# Patient Record
Sex: MALE | Race: WHITE | NOT HISPANIC OR LATINO | Employment: FULL TIME | URBAN - METROPOLITAN AREA
[De-identification: names, ages, dates, MRNs, and addresses within clinical notes are randomized per-mention and may not be internally consistent; named-entity substitution may affect disease eponyms.]

---

## 2024-02-06 ENCOUNTER — HOSPITAL ENCOUNTER (EMERGENCY)
Facility: HOSPITAL | Age: 44
Discharge: HOME | End: 2024-02-06
Attending: EMERGENCY MEDICINE
Payer: COMMERCIAL

## 2024-02-06 VITALS
DIASTOLIC BLOOD PRESSURE: 78 MMHG | HEIGHT: 66 IN | HEART RATE: 72 BPM | RESPIRATION RATE: 18 BRPM | WEIGHT: 200 LBS | BODY MASS INDEX: 32.14 KG/M2 | OXYGEN SATURATION: 98 % | TEMPERATURE: 98.2 F | SYSTOLIC BLOOD PRESSURE: 150 MMHG

## 2024-02-06 DIAGNOSIS — S09.90XA CLOSED HEAD INJURY, INITIAL ENCOUNTER: Primary | ICD-10-CM

## 2024-02-06 PROCEDURE — 99284 EMERGENCY DEPT VISIT MOD MDM: CPT | Performed by: EMERGENCY MEDICINE

## 2024-02-06 PROCEDURE — 99283 EMERGENCY DEPT VISIT LOW MDM: CPT | Performed by: EMERGENCY MEDICINE

## 2024-02-06 RX ORDER — ROSUVASTATIN CALCIUM 20 MG/1
20 TABLET, COATED ORAL
COMMUNITY
Start: 2023-12-27 | End: 2024-12-26

## 2024-02-06 RX ORDER — COLCHICINE 0.6 MG/1
TABLET ORAL
COMMUNITY
Start: 2023-10-24

## 2024-02-06 RX ORDER — PREDNISONE 20 MG/1
TABLET ORAL
COMMUNITY
Start: 2023-10-24

## 2024-02-06 RX ORDER — ALLOPURINOL 100 MG/1
100 TABLET ORAL
COMMUNITY
Start: 2023-10-24 | End: 2024-10-23

## 2024-02-06 ASSESSMENT — COLUMBIA-SUICIDE SEVERITY RATING SCALE - C-SSRS
2. HAVE YOU ACTUALLY HAD ANY THOUGHTS OF KILLING YOURSELF?: NO
6. HAVE YOU EVER DONE ANYTHING, STARTED TO DO ANYTHING, OR PREPARED TO DO ANYTHING TO END YOUR LIFE?: NO
1. IN THE PAST MONTH, HAVE YOU WISHED YOU WERE DEAD OR WISHED YOU COULD GO TO SLEEP AND NOT WAKE UP?: NO

## 2024-02-06 ASSESSMENT — PAIN SCALES - GENERAL: PAINLEVEL_OUTOF10: 0 - NO PAIN

## 2024-02-06 ASSESSMENT — LIFESTYLE VARIABLES
HAVE PEOPLE ANNOYED YOU BY CRITICIZING YOUR DRINKING: NO
EVER FELT BAD OR GUILTY ABOUT YOUR DRINKING: NO
EVER HAD A DRINK FIRST THING IN THE MORNING TO STEADY YOUR NERVES TO GET RID OF A HANGOVER: NO
HAVE YOU EVER FELT YOU SHOULD CUT DOWN ON YOUR DRINKING: NO

## 2024-02-06 ASSESSMENT — PAIN - FUNCTIONAL ASSESSMENT: PAIN_FUNCTIONAL_ASSESSMENT: 0-10

## 2024-02-06 NOTE — DISCHARGE INSTRUCTIONS
Follow up with the concussion clinic per handout.     Please return to the ER or seek immediate medical attention if you experience worsening headache, nausea, vomiting, fever of 38C (100.4) or higher, confusion, difficulty concentrating change in personality, difficulty waking from sleep, neck pain, fainting, seizure, or any new or worsening symptoms.    You are welcome back any time. Thank you for entrusting your care to us, I hope we made your visit as pleasant as possible. Wishing you well!    Dr. Tse=

## 2024-02-06 NOTE — ED PROVIDER NOTES
EMERGENCY DEPARTMENT ENCOUNTER      Pt Name: Cesar Le  MRN: [id number]  Birthdate 1980  Date of evaluation: 2/6/2024  Provider: Benjy Tse DO    CHIEF COMPLAINT       Chief Complaint   Patient presents with    Fall     Mechanical fall at work. Pt denies LOC.      HISTORY OF PRESENT ILLNESS    Cesar Le is a 43 y.o. year old male who presents to the ER for head injury. He was loading a trailer at work, states there was a piece of steel sticking out of the truck, he whacked the right side of his head on it.  Denies loss of consciousness, diplopia, nausea or vomiting, changes in personality, weakness, confusion.  Does endorse mild headache.  Denies fever, chest pain, shortness of breath, abdominal pain, fainting, urine or stool stool changes.     PMH HLD, gout  PSH none  NKDA  Social alcohol use, denies tobacco or drug use   PAST MEDICAL HISTORY     Past Medical History:   Diagnosis Date    Gout     High cholesterol     Hypertension      CURRENT MEDICATIONS       Discharge Medication List as of 2/6/2024  4:50 PM        CONTINUE these medications which have NOT CHANGED    Details   allopurinol (Zyloprim) 100 mg tablet Take 1 tablet (100 mg) by mouth once daily., Starting Tue 10/24/2023, Until Wed 10/23/2024, Historical Med      colchicine 0.6 mg tablet TAKE ONE TABLET BY MOUTH TWICE DAILY AS NEEDED FOR GOUT, Historical Med      predniSONE (Deltasone) 20 mg tablet TAKE ONE TABLET BY MOUTH TWICE DAILY AS NEEDED FOR GOUT, Historical Med      rosuvastatin (Crestor) 20 mg tablet Take 1 tablet (20 mg) by mouth once daily., Starting Wed 12/27/2023, Until Thu 12/26/2024, Historical Med           SURGICAL HISTORY     History reviewed. No pertinent surgical history.  ALLERGIES     Patient has no known allergies.  FAMILY HISTORY     No family history on file.  SOCIAL HISTORY       Social History     Tobacco Use    Smoking status: Never    Smokeless tobacco: Never   Substance Use Topics    Alcohol use: Never     Drug use: Never     PHYSICAL EXAM  (up to 7 for level 4, 8 or more for level 5)     ED Triage Vitals [02/06/24 1552]   Temperature Heart Rate Respirations BP   36.8 °C (98.2 °F) 74 16 166/90      Pulse Ox Temp Source Heart Rate Source Patient Position   97 % Temporal Monitor Sitting      BP Location FiO2 (%)     Right arm --       Physical Exam  Vitals and nursing note reviewed.   Constitutional:       General: He is not in acute distress.     Appearance: Normal appearance. He is not ill-appearing.   HENT:      Head: Normocephalic. No contusion or laceration.      Jaw: No trismus or malocclusion.      Comments: Right erythematous lump, closed, ttp     Right Ear: External ear normal.      Left Ear: External ear normal.      Mouth/Throat:      Mouth: Mucous membranes are moist.      Pharynx: Oropharynx is clear.   Eyes:      Extraocular Movements: Extraocular movements intact.      Pupils: Pupils are equal, round, and reactive to light.   Neck:      Trachea: No tracheal deviation.   Cardiovascular:      Rate and Rhythm: Normal rate and regular rhythm.      Pulses: Normal pulses.   Pulmonary:      Effort: No respiratory distress.      Breath sounds: No wheezing, rhonchi or rales.   Chest:      Chest wall: No tenderness.   Abdominal:      General: Abdomen is flat. There is no distension.      Palpations: Abdomen is soft. There is no mass.      Tenderness: There is no abdominal tenderness. There is no right CVA tenderness or left CVA tenderness.   Musculoskeletal:         General: No tenderness or signs of injury.      Cervical back: Normal range of motion. No tenderness.      Right lower leg: No edema.      Left lower leg: No edema.   Skin:     Coloration: Skin is not jaundiced or pale.      Findings: No rash or wound.   Neurological:      General: No focal deficit present.      Mental Status: He is alert and oriented to person, place, and time. Mental status is at baseline.      Cranial Nerves: No cranial nerve  "deficit.      Sensory: No sensory deficit.      Motor: No weakness.      Coordination: Coordination normal.      Gait: Gait normal.   Psychiatric:         Speech: Speech normal.         Behavior: Behavior normal.         Thought Content: Thought content does not include homicidal or suicidal ideation.         Judgment: Judgment normal.        DIAGNOSTIC RESULTS   LABS:  Labs Reviewed - No data to display  All other labs were within normal range or not returned as of this dictation.  Imaging  No orders to display      Procedure  Procedures  EMERGENCY DEPARTMENT COURSE/MDM:   Medical Decision Making    Vitals:    Vitals:    02/06/24 1552 02/06/24 1654   BP: 166/90 150/78   BP Location: Right arm Right arm   Patient Position: Sitting Sitting   Pulse: 74 72   Resp: 16 18   Temp: 36.8 °C (98.2 °F)    TempSrc: Temporal    SpO2: 97% 98%   Weight: 90.7 kg (200 lb)    Height: 1.676 m (5' 6\")      Cesar Le is a male 43 y.o. who presents to the ER for closed head injury. On arrival the patients vital signs were: Afebrile, Reguar HR, Normotensive, Regular RR, and Oxygenating well on room air. History obtained from: patient and employer . No red flag signs present such as severe headache, syncope, change in GCS, nausea/vomiting, diplopia, focal neurodeficit, change in personality, gait change. He is low risk, not on blood thinners. Injury closed, no lac to repair. Shared decision was made regarding risks and benefits of CT head, patient declined CT at this time.    Diagnoses as of 02/06/24 1655   Closed head injury, initial encounter     External Records Reviewed: I reviewed recent and relevant outside records including inpatient notes, outpatient records  Social Determinants Affecting Care:  Workman's Comp    Shared decision making for disposition  Patient and/or patient´s representative was counseled regarding labs, imaging, likely diagnosis. All questions were answered. Recommendation was made   for discharge home. The " patient agreed and was discharged home in stable condition with appropriate relevant educational materials. Return precautions were provided which included worsening headache, nausea, vomiting, fever of 38C (100.4) or higher, confusion, difficulty concentrating change in personality, difficulty waking from sleep, neck pain, fainting, seizure, or any new or worsening symptoms..     Concussion clinic handout provided for follow up.      Follow-up:  E[place]cclellan [place]  [id number]  [facility], [address]  [facility] [id number] [phone]              Final Impression:   1. Closed head injury, initial encounter          I reviewed the case with the attending ED physician. The attending ED physician agrees with the plan.   Benjy Tse DO  PGY-2  Emergency Medicine      Please excuse any misspellings or unintended errors related to the Dragon speech recognition software used to dictate this note.       Benjy Tse DO  Resident  02/06/24 6030